# Patient Record
Sex: FEMALE | Race: WHITE | NOT HISPANIC OR LATINO | Employment: FULL TIME | URBAN - NONMETROPOLITAN AREA
[De-identification: names, ages, dates, MRNs, and addresses within clinical notes are randomized per-mention and may not be internally consistent; named-entity substitution may affect disease eponyms.]

---

## 2020-03-06 ENCOUNTER — TRANSFERRED RECORDS (OUTPATIENT)
Dept: HEALTH INFORMATION MANAGEMENT | Facility: OTHER | Age: 50
End: 2020-03-06

## 2021-07-27 ENCOUNTER — TRANSFERRED RECORDS (OUTPATIENT)
Dept: MULTI SPECIALTY CLINIC | Facility: CLINIC | Age: 51
End: 2021-07-27

## 2021-07-27 LAB — PAP-ABSTRACT: NORMAL

## 2023-03-01 ENCOUNTER — APPOINTMENT (OUTPATIENT)
Dept: LAB | Facility: OTHER | Age: 53
End: 2023-03-01

## 2023-03-01 ENCOUNTER — LAB REQUISITION (OUTPATIENT)
Dept: LAB | Facility: OTHER | Age: 53
End: 2023-03-01
Payer: COMMERCIAL

## 2023-03-01 DIAGNOSIS — Z02.1 ENCOUNTER FOR PRE-EMPLOYMENT EXAMINATION: ICD-10-CM

## 2023-03-01 PROCEDURE — 36415 COLL VENOUS BLD VENIPUNCTURE: CPT | Mod: ORL | Performed by: INTERNAL MEDICINE

## 2023-03-01 PROCEDURE — 86481 TB AG RESPONSE T-CELL SUSP: CPT | Performed by: INTERNAL MEDICINE

## 2023-03-02 LAB
QUANTIFERON MITOGEN: 10 IU/ML
QUANTIFERON NIL TUBE: 0.06 IU/ML
QUANTIFERON TB1 TUBE: 0.06 IU/ML
QUANTIFERON TB2 TUBE: 0.05

## 2023-03-03 LAB
GAMMA INTERFERON BACKGROUND BLD IA-ACNC: 0.06 IU/ML
M TB IFN-G BLD-IMP: NEGATIVE
M TB IFN-G CD4+ BCKGRND COR BLD-ACNC: 9.94 IU/ML
MITOGEN IGNF BCKGRD COR BLD-ACNC: -0.01 IU/ML
MITOGEN IGNF BCKGRD COR BLD-ACNC: 0 IU/ML

## 2023-04-04 DIAGNOSIS — M25.541 ARTHRALGIA OF BOTH HANDS: Primary | ICD-10-CM

## 2023-04-04 DIAGNOSIS — M25.542 ARTHRALGIA OF BOTH HANDS: Primary | ICD-10-CM

## 2023-04-07 ENCOUNTER — HOSPITAL ENCOUNTER (OUTPATIENT)
Dept: GENERAL RADIOLOGY | Facility: OTHER | Age: 53
Discharge: HOME | End: 2023-04-07
Attending: SPECIALIST
Payer: COMMERCIAL

## 2023-04-07 ENCOUNTER — OFFICE VISIT (OUTPATIENT)
Dept: ORTHOPEDICS | Facility: OTHER | Age: 53
End: 2023-04-07
Attending: SPECIALIST
Payer: COMMERCIAL

## 2023-04-07 VITALS — OXYGEN SATURATION: 98 % | HEART RATE: 65 BPM

## 2023-04-07 DIAGNOSIS — M25.542 ARTHRALGIA OF BOTH HANDS: ICD-10-CM

## 2023-04-07 DIAGNOSIS — M25.542 ARTHRALGIA OF BOTH HANDS: Primary | Chronic | ICD-10-CM

## 2023-04-07 DIAGNOSIS — M25.541 ARTHRALGIA OF BOTH HANDS: ICD-10-CM

## 2023-04-07 DIAGNOSIS — M25.541 ARTHRALGIA OF BOTH HANDS: Primary | Chronic | ICD-10-CM

## 2023-04-07 PROCEDURE — 250N000011 HC RX IP 250 OP 636: Performed by: SPECIALIST

## 2023-04-07 PROCEDURE — 20600 DRAIN/INJ JOINT/BURSA W/O US: CPT | Mod: FA | Performed by: SPECIALIST

## 2023-04-07 PROCEDURE — 73130 X-RAY EXAM OF HAND: CPT | Mod: LT

## 2023-04-07 PROCEDURE — 250N000009 HC RX 250: Performed by: SPECIALIST

## 2023-04-07 RX ORDER — BUPIVACAINE HYDROCHLORIDE 5 MG/ML
2 INJECTION, SOLUTION EPIDURAL; INTRACAUDAL ONCE
Status: COMPLETED | OUTPATIENT
Start: 2023-04-07 | End: 2023-04-07

## 2023-04-07 RX ORDER — METHYLPREDNISOLONE ACETATE 40 MG/ML
40 INJECTION, SUSPENSION INTRA-ARTICULAR; INTRALESIONAL; INTRAMUSCULAR; SOFT TISSUE ONCE
Status: COMPLETED | OUTPATIENT
Start: 2023-04-07 | End: 2023-04-07

## 2023-04-07 RX ADMIN — METHYLPREDNISOLONE ACETATE 40 MG: 40 INJECTION, SUSPENSION INTRA-ARTICULAR; INTRALESIONAL; INTRAMUSCULAR; SOFT TISSUE at 09:16

## 2023-04-07 RX ADMIN — BUPIVACAINE HYDROCHLORIDE 10 MG: 5 INJECTION, SOLUTION EPIDURAL; INTRACAUDAL; PERINEURAL at 09:15

## 2023-04-07 ASSESSMENT — PAIN SCALES - GENERAL: PAINLEVEL: MODERATE PAIN (4)

## 2023-04-07 NOTE — PROGRESS NOTES
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  I am seeing her today for evaluation of bilateral hand complaints.  These involve specifically the right ring finger PIP joint, the left small finger PIP joint and left thumb basilar joint.  She had a history of right ring finger injection years ago, which helped.  She has an equivocal family history for rheumatoid arthritis.    PHYSICAL EXAMINATION:  Examination reveals a 52-year-old female.  She is alert and oriented x3 and appropriate.  Gait and station are appropriate.  She is well groomed, well kempt.  Examination of both upper extremities reveals full and symmetric range of motion of elbows.  Examination of both wrists show no evidence of extensor or flexor tenosynovitis.  Examination of the left thumb basilar joint shows tenderness with a positive grind maneuver.  Palmar abduction is mildly limited.  Ring finger PIP joint on the right shows 20-55 degree arc of motion.  The left small finger shows a 15-70 degree arc of motion.    IMAGING:  X-rays were reviewed including AP, lateral, and oblique views of the right hand obtained in the office today.  These reveal severe degenerative changes of the right ring finger PIP joint.  Left hand films are also reviewed from today.  These show degenerative changes of the PIP joint of the left small finger with basilar joint arthrosis.  There is no evidence of obvious periarticular erosions.    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  We discussed proceeding with injections at all these sites.      PROCEDURE:  A sterile prep was performed and those three sites were each injected with Depo-Medrol 0.25% Marcaine with epinephrine, total volume of 20 mg of Depo in the digits and 40 in the base of the thumb.  After a period of observation, she  noted significant improvement.    PLAN:  Our plan will be to follow her clinically and proceed as indicated.  Ultimately arthrodesis of the PIP joint would be recommended and basilar joint reconstruction if her symptoms warrant it.    Julien Brewster MD        D: 2023   T: 2023   MT: JONATHAN    Name:     MADELAINE SEXTON  MRN:      [id number]        Account:    [id number]   :      1970           Visit Date: 2023     Document: [id number]

## 2023-04-07 NOTE — PROGRESS NOTES
;Patient is here for consult on her hands.  Deb Bailey LPN .....................4/7/2023 8:46 AM

## 2023-05-21 ENCOUNTER — HEALTH MAINTENANCE LETTER (OUTPATIENT)
Age: 53
End: 2023-05-21

## 2023-07-05 NOTE — PROGRESS NOTES
SUBJECTIVE:   CC: Rosmery is an 52 year old who presents for preventive health visit.     History of Present Illness       Reason for visit:  Wellness Annual check up    She eats 2-3 servings of fruits and vegetables daily.She consumes 0 sweetened beverage(s) daily.She exercises with enough effort to increase her heart rate 10 to 19 minutes per day.  She exercises with enough effort to increase her heart rate 3 or less days per week.   She is taking medications regularly.    Here for annual physical.  Recently moved to [place] and is requesting to establish care.    Had been on hormonal replacement therapy for vasomotor symptoms of menopause but patient was struggling with side effects so discontinued.  Not currently on any prescription medications.    Solitary pulmonary nodule noted as incidental finding 4/2021. 12 month follow up with repeat CT was recommended in 7/2021 but has not yet been completed.       Contraception: Post menopausal  Risk for STI?: No  Last pap: 07/27/2021, NIL, HPV- results will be abstracted  Any hx of abnormal paps:  Hx PITER II, Had negative pap and HPV 5/2020 as well per chart review  FH of early CA?: Mother with colon cancer  Cholesterol/DM concerns/screening: Due  Tobacco?: Former  Calcium intake: Dietary  DEXA: NA  Last mammo: Unable to review  Colonoscopy: Cologuard ordered in 2021- unsure if completed, new [facility] of colon cancer since then  Immunizations: UTD      Have you ever done Advance Care Planning? (For example, a Health Directive, POLST, or a discussion with a medical provider or your loved ones about your wishes): No, advance care planning information given to patient to review.  Patient declined advance care planning discussion at this time.    Social History     Tobacco Use     Smoking status: Never     Smokeless tobacco: Never   Substance Use Topics     Alcohol use: Yes     Reviewed orders with patient.  Reviewed health maintenance and updated orders accordingly -  "Yes      Breast Cancer Screening:    FHS-7:        No data to display                Mammogram Screening: Recommended annual mammography  Pertinent mammograms are reviewed under the imaging tab.    History of abnormal Pap smear: Last 3 Pap and HPV Results:         Reviewed and updated as needed this visit by clinical staff   Tobacco  Allergies  Meds     Fam Hx  Soc Hx        Reviewed and updated as needed this visit by Provider         [facility] Hx         No past medical history on file.   No past surgical history on file.  OB History   No obstetric history on file.       Review of Systems  CONSTITUTIONAL: NEGATIVE for fever, chills, change in weight  INTEGUMENTARY/SKIN: skin lesions  EYES: NEGATIVE for vision changes or irritation  ENT: NEGATIVE for ear, mouth and throat problems  RESP: NEGATIVE for significant cough or SOB  BREAST: NEGATIVE for masses, tenderness or discharge  CV: NEGATIVE for chest pain, palpitations or peripheral edema  GI: NEGATIVE for nausea, abdominal pain, heartburn, or change in bowel habits  : NEGATIVE for unusual urinary or vaginal symptoms. No vaginal bleeding.  MUSCULOSKELETAL:bilateral hand pain  NEURO: NEGATIVE for weakness, dizziness or paresthesias  PSYCHIATRIC: NEGATIVE for changes in mood or affect      OBJECTIVE:   /78   Pulse 64   Temp (!) 96.5  F (35.8  C)   Resp 14   Ht 1.676 m (5' 6\")   Wt 84.2 kg (185 lb 9.6 oz)   LMP 01/01/2018 (Exact Date)   SpO2 96%   BMI 29.96 kg/m    Physical Exam  GENERAL: healthy, alert and no distress  EYES: Eyes grossly normal to inspection, PERRL and conjunctivae and sclerae normal  HENT: ear canals and TM's normal, nose and mouth without ulcers or lesions  NECK: no adenopathy, no asymmetry, masses, or scars and thyroid normal to palpation  RESP: lungs clear to auscultation - no rales, rhonchi or wheezes  BREAST: normal without masses, tenderness or nipple discharge and no palpable axillary masses or adenopathy  CV: regular rate and " rhythm, normal S1 S2, no S3 or S4, no murmur, click or rub, no peripheral edema and peripheral pulses strong  MS: no gross musculoskeletal defects noted, no edema  SKIN: no suspicious lesions or rashes  SKIN: no suspicious lesions or rashes and numerous brown raised sand paper like lesions throughout back and right breast  NEURO: Normal strength and tone, mentation intact and speech normal  PSYCH: mentation appears normal, affect normal/bright    Diagnostic Test Results:  Labs reviewed in Epic    ASSESSMENT/PLAN:       ICD-10-CM    1. Routine history and physical examination of adult  Z00.00 Lipid Panel     CBC and Differential     Basic Metabolic Panel      2. Encounter for screening mammogram for breast cancer  Z12.31 MA Screen Bilateral w/Jef      3. Special screening for malignant neoplasms, colon  Z12.11 Colonoscopy Screening  Referral      4. Family history of colon cancer  Z80.0 Colonoscopy Screening  Referral      5. Solitary pulmonary nodule on lung CT  R91.1         1. UTD on pap smear, due in 2026. Labs pending as above. UTD on immunizations. Encourage healthy lifestyle.     2. Mammogram ordered.    3, 4. Mother recently diagnosed with colon cancer. Unable to review previous Cologuard results. Referral for colon cancer screening.     5. Incidentally noted in 05/2021. Recommended repeat CT chest which patient deferred at this time. She will consider once the new calendar year starts for better insurance coverage.     Patient has been advised of split billing requirements and indicates understanding: Yes      COUNSELING:  Reviewed preventive health counseling, as reflected in patient instructions        She reports that she has never smoked. She has never used smokeless tobacco.      Lesli Rivera PA-C  [facility] AND \[facility]\""

## 2023-07-06 ENCOUNTER — OFFICE VISIT (OUTPATIENT)
Dept: FAMILY MEDICINE | Facility: OTHER | Age: 53
End: 2023-07-06
Attending: PHYSICIAN ASSISTANT
Payer: COMMERCIAL

## 2023-07-06 VITALS
DIASTOLIC BLOOD PRESSURE: 78 MMHG | WEIGHT: 185.6 LBS | HEIGHT: 66 IN | RESPIRATION RATE: 14 BRPM | TEMPERATURE: 96.5 F | BODY MASS INDEX: 29.83 KG/M2 | SYSTOLIC BLOOD PRESSURE: 124 MMHG | OXYGEN SATURATION: 96 % | HEART RATE: 64 BPM

## 2023-07-06 DIAGNOSIS — Z12.31 ENCOUNTER FOR SCREENING MAMMOGRAM FOR BREAST CANCER: ICD-10-CM

## 2023-07-06 DIAGNOSIS — Z80.0 FAMILY HISTORY OF COLON CANCER: ICD-10-CM

## 2023-07-06 DIAGNOSIS — Z12.11 SPECIAL SCREENING FOR MALIGNANT NEOPLASMS, COLON: Primary | ICD-10-CM

## 2023-07-06 DIAGNOSIS — R91.1 SOLITARY PULMONARY NODULE ON LUNG CT: ICD-10-CM

## 2023-07-06 DIAGNOSIS — Z12.11 SPECIAL SCREENING FOR MALIGNANT NEOPLASMS, COLON: ICD-10-CM

## 2023-07-06 DIAGNOSIS — Z00.00 ROUTINE HISTORY AND PHYSICAL EXAMINATION OF ADULT: Primary | ICD-10-CM

## 2023-07-06 PROBLEM — R31.0 GROSS HEMATURIA: Status: ACTIVE | Noted: 2022-03-16

## 2023-07-06 PROBLEM — N95.1 VASOMOTOR SYMPTOMS DUE TO MENOPAUSE: Status: ACTIVE | Noted: 2021-03-15

## 2023-07-06 PROBLEM — M19.041 PRIMARY OSTEOARTHRITIS, RIGHT HAND: Status: ACTIVE | Noted: 2019-12-06

## 2023-07-06 PROBLEM — H02.9 UNSPECIFIED DISORDER OF EYELID: Status: ACTIVE | Noted: 2019-12-06

## 2023-07-06 PROBLEM — K80.20 CALCULUS OF GALLBLADDER WITHOUT CHOLECYSTITIS WITHOUT OBSTRUCTION: Status: ACTIVE | Noted: 2021-08-17

## 2023-07-06 PROBLEM — E66.811 OBESITY (BMI 30.0-34.9): Status: ACTIVE | Noted: 2021-03-15

## 2023-07-06 LAB
ANION GAP SERPL CALCULATED.3IONS-SCNC: 9 MMOL/L (ref 7–15)
BASOPHILS # BLD AUTO: 0.1 10E3/UL (ref 0–0.2)
BASOPHILS NFR BLD AUTO: 1 %
BUN SERPL-MCNC: 14.4 MG/DL (ref 6–20)
CALCIUM SERPL-MCNC: 9.6 MG/DL (ref 8.6–10)
CHLORIDE SERPL-SCNC: 105 MMOL/L (ref 98–107)
CHOLEST SERPL-MCNC: 193 MG/DL
CREAT SERPL-MCNC: 0.7 MG/DL (ref 0.51–0.95)
DEPRECATED HCO3 PLAS-SCNC: 27 MMOL/L (ref 22–29)
EOSINOPHIL # BLD AUTO: 0.1 10E3/UL (ref 0–0.7)
EOSINOPHIL NFR BLD AUTO: 2 %
ERYTHROCYTE [DISTWIDTH] IN BLOOD BY AUTOMATED COUNT: 12.7 % (ref 10–15)
GFR SERPL CREATININE-BSD FRML MDRD: >90 ML/MIN/1.73M2
GLUCOSE SERPL-MCNC: 98 MG/DL (ref 70–99)
HCT VFR BLD AUTO: 43 % (ref 35–47)
HDLC SERPL-MCNC: 56 MG/DL
HGB BLD-MCNC: 14.1 G/DL (ref 11.7–15.7)
IMM GRANULOCYTES # BLD: 0 10E3/UL
IMM GRANULOCYTES NFR BLD: 0 %
LDLC SERPL CALC-MCNC: 116 MG/DL
LYMPHOCYTES # BLD AUTO: 1.3 10E3/UL (ref 0.8–5.3)
LYMPHOCYTES NFR BLD AUTO: 30 %
MCH RBC QN AUTO: 31 PG (ref 26.5–33)
MCHC RBC AUTO-ENTMCNC: 32.8 G/DL (ref 31.5–36.5)
MCV RBC AUTO: 95 FL (ref 78–100)
MONOCYTES # BLD AUTO: 0.4 10E3/UL (ref 0–1.3)
MONOCYTES NFR BLD AUTO: 10 %
NEUTROPHILS # BLD AUTO: 2.4 10E3/UL (ref 1.6–8.3)
NEUTROPHILS NFR BLD AUTO: 57 %
NONHDLC SERPL-MCNC: 137 MG/DL
NRBC # BLD AUTO: 0 10E3/UL
NRBC BLD AUTO-RTO: 0 /100
PLATELET # BLD AUTO: 338 10E3/UL (ref 150–450)
POTASSIUM SERPL-SCNC: 4.2 MMOL/L (ref 3.4–5.3)
RBC # BLD AUTO: 4.55 10E6/UL (ref 3.8–5.2)
SODIUM SERPL-SCNC: 141 MMOL/L (ref 136–145)
TRIGL SERPL-MCNC: 106 MG/DL
WBC # BLD AUTO: 4.2 10E3/UL (ref 4–11)

## 2023-07-06 PROCEDURE — 36415 COLL VENOUS BLD VENIPUNCTURE: CPT | Mod: ZL | Performed by: PHYSICIAN ASSISTANT

## 2023-07-06 PROCEDURE — 80061 LIPID PANEL: CPT | Mod: ZL | Performed by: PHYSICIAN ASSISTANT

## 2023-07-06 PROCEDURE — 85025 COMPLETE CBC W/AUTO DIFF WBC: CPT | Mod: ZL | Performed by: PHYSICIAN ASSISTANT

## 2023-07-06 PROCEDURE — 80048 BASIC METABOLIC PNL TOTAL CA: CPT | Mod: ZL | Performed by: PHYSICIAN ASSISTANT

## 2023-07-06 PROCEDURE — 99396 PREV VISIT EST AGE 40-64: CPT | Performed by: PHYSICIAN ASSISTANT

## 2023-07-06 RX ORDER — BISACODYL 5 MG/1
TABLET, DELAYED RELEASE ORAL
Qty: 2 TABLET | Refills: 0 | Status: ON HOLD | OUTPATIENT
Start: 2023-07-06 | End: 2023-11-08

## 2023-07-06 RX ORDER — POLYETHYLENE GLYCOL 3350, SODIUM CHLORIDE, SODIUM BICARBONATE, POTASSIUM CHLORIDE 420; 11.2; 5.72; 1.48 G/4L; G/4L; G/4L; G/4L
4000 POWDER, FOR SOLUTION ORAL ONCE
Qty: 4000 ML | Refills: 0 | Status: SHIPPED | OUTPATIENT
Start: 2023-07-06 | End: 2023-07-06

## 2023-07-06 ASSESSMENT — PAIN SCALES - GENERAL: PAINLEVEL: EXTREME PAIN (8)

## 2023-07-06 NOTE — NURSING NOTE
Patient presents to clinic for annual physical.  Mojgan Lawrence LPN ....................  7/6/2023   7:39 AM  EXT [id number]

## 2023-07-06 NOTE — TELEPHONE ENCOUNTER
Screening Questions for the Scheduling of Screening Colonoscopies   (If Colonoscopy is diagnostic, Provider should review the chart before scheduling.)  Are you younger than 50 or older than 80?  NO  Do you take aspirin or fish oil?  NO (if yes, tell patient to stop 1 week prior to Colonoscopy)  Do you take warfarin (Coumadin), clopidogrel (Plavix), apixaban (Eliquis), dabigatram (Pradaxa), rivaroxaban (Xarelto) or any blood thinner? NO  Do you use oxygen at home?  NO  Do you have kidney disease? NO  Are you on dialysis? NO  Have you had a stroke or heart attack in the last year? NO  Have you had a stent in your heart or any blood vessel in the last year? NO  Have you had a transplant of any organ? NO  Have you had a colonoscopy or upper endoscopy (EGD) before? NO  Date of scheduled Colonoscopy. 11/08/2023  Provider [facility]  Pharmacy [facility]

## 2023-10-16 ENCOUNTER — HOSPITAL ENCOUNTER (OUTPATIENT)
Dept: MAMMOGRAPHY | Facility: OTHER | Age: 53
Discharge: HOME | End: 2023-10-16
Attending: PHYSICIAN ASSISTANT | Admitting: PHYSICIAN ASSISTANT
Payer: COMMERCIAL

## 2023-10-16 DIAGNOSIS — Z12.31 ENCOUNTER FOR SCREENING MAMMOGRAM FOR BREAST CANCER: ICD-10-CM

## 2023-10-16 PROCEDURE — 77063 BREAST TOMOSYNTHESIS BI: CPT

## 2023-10-18 ENCOUNTER — HOSPITAL ENCOUNTER (OUTPATIENT)
Dept: ULTRASOUND IMAGING | Facility: OTHER | Age: 53
Discharge: HOME | End: 2023-10-18
Attending: PHYSICIAN ASSISTANT
Payer: COMMERCIAL

## 2023-10-18 ENCOUNTER — HOSPITAL ENCOUNTER (OUTPATIENT)
Dept: MAMMOGRAPHY | Facility: OTHER | Age: 53
Discharge: HOME | End: 2023-10-18
Attending: PHYSICIAN ASSISTANT
Payer: COMMERCIAL

## 2023-10-18 DIAGNOSIS — R92.8 ABNORMAL FINDING ON BREAST IMAGING: ICD-10-CM

## 2023-10-18 PROCEDURE — 76642 ULTRASOUND BREAST LIMITED: CPT | Mod: LT

## 2023-10-18 PROCEDURE — G0279 TOMOSYNTHESIS, MAMMO: HCPCS | Mod: LT

## 2023-11-08 ENCOUNTER — HOSPITAL ENCOUNTER (OUTPATIENT)
Facility: OTHER | Age: 53
Discharge: HOME | End: 2023-11-08
Attending: SURGERY | Admitting: SURGERY
Payer: COMMERCIAL

## 2023-11-08 ENCOUNTER — ANESTHESIA EVENT (OUTPATIENT)
Dept: SURGERY | Facility: OTHER | Age: 53
End: 2023-11-08
Payer: COMMERCIAL

## 2023-11-08 ENCOUNTER — ANESTHESIA (OUTPATIENT)
Dept: SURGERY | Facility: OTHER | Age: 53
End: 2023-11-08
Payer: COMMERCIAL

## 2023-11-08 VITALS
BODY MASS INDEX: 29.76 KG/M2 | HEART RATE: 62 BPM | OXYGEN SATURATION: 98 % | TEMPERATURE: 96.9 F | SYSTOLIC BLOOD PRESSURE: 116 MMHG | DIASTOLIC BLOOD PRESSURE: 91 MMHG | WEIGHT: 184.4 LBS | RESPIRATION RATE: 18 BRPM

## 2023-11-08 DIAGNOSIS — K57.30 DIVERTICULOSIS OF COLON WITHOUT DIVERTICULITIS: Primary | ICD-10-CM

## 2023-11-08 PROCEDURE — G0121 COLON CA SCRN NOT HI RSK IND: HCPCS | Performed by: SURGERY

## 2023-11-08 PROCEDURE — 250N000009 HC RX 250: Performed by: NURSE ANESTHETIST, CERTIFIED REGISTERED

## 2023-11-08 PROCEDURE — 45378 DIAGNOSTIC COLONOSCOPY: CPT | Performed by: SURGERY

## 2023-11-08 PROCEDURE — 45378 DIAGNOSTIC COLONOSCOPY: CPT | Performed by: NURSE ANESTHETIST, CERTIFIED REGISTERED

## 2023-11-08 PROCEDURE — 250N000011 HC RX IP 250 OP 636: Performed by: NURSE ANESTHETIST, CERTIFIED REGISTERED

## 2023-11-08 PROCEDURE — 258N000003 HC RX IP 258 OP 636: Performed by: SURGERY

## 2023-11-08 RX ORDER — PROCHLORPERAZINE MALEATE 5 MG
10 TABLET ORAL EVERY 6 HOURS PRN
Status: DISCONTINUED | OUTPATIENT
Start: 2023-11-08 | End: 2023-11-08 | Stop reason: HOSPADM

## 2023-11-08 RX ORDER — LIDOCAINE 40 MG/G
CREAM TOPICAL
Status: DISCONTINUED | OUTPATIENT
Start: 2023-11-08 | End: 2023-11-08 | Stop reason: HOSPADM

## 2023-11-08 RX ORDER — NALOXONE HYDROCHLORIDE 0.4 MG/ML
0.2 INJECTION, SOLUTION INTRAMUSCULAR; INTRAVENOUS; SUBCUTANEOUS
Status: DISCONTINUED | OUTPATIENT
Start: 2023-11-08 | End: 2023-11-08 | Stop reason: HOSPADM

## 2023-11-08 RX ORDER — PROPOFOL 10 MG/ML
INJECTION, EMULSION INTRAVENOUS PRN
Status: DISCONTINUED | OUTPATIENT
Start: 2023-11-08 | End: 2023-11-08

## 2023-11-08 RX ORDER — NALOXONE HYDROCHLORIDE 0.4 MG/ML
0.4 INJECTION, SOLUTION INTRAMUSCULAR; INTRAVENOUS; SUBCUTANEOUS
Status: DISCONTINUED | OUTPATIENT
Start: 2023-11-08 | End: 2023-11-08 | Stop reason: HOSPADM

## 2023-11-08 RX ORDER — ONDANSETRON 4 MG/1
4 TABLET, ORALLY DISINTEGRATING ORAL EVERY 6 HOURS PRN
Status: DISCONTINUED | OUTPATIENT
Start: 2023-11-08 | End: 2023-11-08 | Stop reason: HOSPADM

## 2023-11-08 RX ORDER — ONDANSETRON 2 MG/ML
4 INJECTION INTRAMUSCULAR; INTRAVENOUS EVERY 6 HOURS PRN
Status: DISCONTINUED | OUTPATIENT
Start: 2023-11-08 | End: 2023-11-08 | Stop reason: HOSPADM

## 2023-11-08 RX ORDER — FLUMAZENIL 0.1 MG/ML
0.2 INJECTION, SOLUTION INTRAVENOUS
Status: DISCONTINUED | OUTPATIENT
Start: 2023-11-08 | End: 2023-11-08 | Stop reason: HOSPADM

## 2023-11-08 RX ORDER — PROPOFOL 10 MG/ML
INJECTION, EMULSION INTRAVENOUS CONTINUOUS PRN
Status: DISCONTINUED | OUTPATIENT
Start: 2023-11-08 | End: 2023-11-08

## 2023-11-08 RX ORDER — ONDANSETRON 2 MG/ML
4 INJECTION INTRAMUSCULAR; INTRAVENOUS
Status: DISCONTINUED | OUTPATIENT
Start: 2023-11-08 | End: 2023-11-08 | Stop reason: HOSPADM

## 2023-11-08 RX ORDER — ONDANSETRON 2 MG/ML
INJECTION INTRAMUSCULAR; INTRAVENOUS PRN
Status: DISCONTINUED | OUTPATIENT
Start: 2023-11-08 | End: 2023-11-08

## 2023-11-08 RX ORDER — SODIUM CHLORIDE, SODIUM LACTATE, POTASSIUM CHLORIDE, CALCIUM CHLORIDE 600; 310; 30; 20 MG/100ML; MG/100ML; MG/100ML; MG/100ML
INJECTION, SOLUTION INTRAVENOUS CONTINUOUS
Status: DISCONTINUED | OUTPATIENT
Start: 2023-11-08 | End: 2023-11-08 | Stop reason: HOSPADM

## 2023-11-08 RX ORDER — LIDOCAINE HYDROCHLORIDE 20 MG/ML
INJECTION, SOLUTION INFILTRATION; PERINEURAL PRN
Status: DISCONTINUED | OUTPATIENT
Start: 2023-11-08 | End: 2023-11-08

## 2023-11-08 RX ADMIN — PROPOFOL 100 MG: 10 INJECTION, EMULSION INTRAVENOUS at 08:53

## 2023-11-08 RX ADMIN — PROPOFOL 150 MCG/KG/MIN: 10 INJECTION, EMULSION INTRAVENOUS at 08:56

## 2023-11-08 RX ADMIN — ONDANSETRON HYDROCHLORIDE 4 MG: 2 SOLUTION INTRAMUSCULAR; INTRAVENOUS at 08:57

## 2023-11-08 RX ADMIN — SODIUM CHLORIDE, SODIUM LACTATE, POTASSIUM CHLORIDE, AND CALCIUM CHLORIDE: 600; 310; 30; 20 INJECTION, SOLUTION INTRAVENOUS at 08:48

## 2023-11-08 RX ADMIN — LIDOCAINE HYDROCHLORIDE 60 MG: 20 INJECTION, SOLUTION INFILTRATION; PERINEURAL at 08:53

## 2023-11-08 ASSESSMENT — ACTIVITIES OF DAILY LIVING (ADL)
ADLS_ACUITY_SCORE: 35
ADLS_ACUITY_SCORE: 35
ADLS_ACUITY_SCORE: 33

## 2023-11-08 NOTE — ANESTHESIA CARE TRANSFER NOTE
Patient: Chantel Menard    Procedure: Procedure(s):  Colonoscopy       Diagnosis: Encounter for screening colonoscopy [Z12.11]  Family hx of colon cancer [Z80.0]  Diagnosis Additional Information: No value filed.    Anesthesia Type:   MAC     Note:    Oropharynx: oropharynx clear of all foreign objects and spontaneously breathing  Level of Consciousness: drowsy  Oxygen Supplementation: room air    Independent Airway: airway patency satisfactory and stable  Dentition: dentition unchanged  Vital Signs Stable: post-procedure vital signs reviewed and stable    Patient transferred to: Phase II    Handoff Report: Identifed the Patient, Identified the Reponsible Provider, Reviewed the pertinent medical history, Discussed the surgical course, Reviewed Intra-OP anesthesia mangement and issues during anesthesia, Set expectations for post-procedure period and Allowed opportunity for questions and acknowledgement of understanding      Vitals:  Vitals Value Taken Time   BP     Temp     Pulse     Resp     SpO2         Electronically Signed By: ELIAZAR PHAN [facility]  November 8, 2023  9:34 AM

## 2023-11-08 NOTE — ANESTHESIA POSTPROCEDURE EVALUATION
Patient: Chantel Menard    Procedure: Procedure(s):  Colonoscopy       Anesthesia Type:  MAC    Note:  Disposition: Outpatient   Postop Pain Control: Uneventful            Sign Out: Well controlled pain   PONV: No   Neuro/Psych: Uneventful            Sign Out: Acceptable/Baseline neuro status   Airway/Respiratory: Uneventful            Sign Out: Acceptable/Baseline resp. status   CV/Hemodynamics: Uneventful            Sign Out: Acceptable CV status; No obvious hypovolemia; No obvious fluid overload   Other NRE: NONE   DID A NON-ROUTINE EVENT OCCUR?            Last vitals:  Vitals Value Taken Time   /91 11/08/23 1030   Temp 96.9  F (36.1  C) 11/08/23 0933   Pulse 62 11/08/23 1030   Resp 16 11/08/23 0945   SpO2 96 % 11/08/23 1037   Vitals shown include unfiled device data.    Electronically Signed By: ELIAZAR MOSES CRNA  November 8, 2023  10:37 AM

## 2023-11-08 NOTE — ANESTHESIA PREPROCEDURE EVALUATION
Anesthesia Pre-Procedure Evaluation    Patient: Chantel Menard   MRN: [id number] : 1970        Procedure : Procedure(s):  Colonoscopy          History reviewed. No pertinent past medical history.   History reviewed. No pertinent surgical history.   No Known Allergies   Social History     Tobacco Use    Smoking status: Never    Smokeless tobacco: Never   Substance Use Topics    Alcohol use: Yes      Wt Readings from Last 1 Encounters:   23 84.2 kg (185 lb 9.6 oz)        Anesthesia Evaluation   Pt has had prior anesthetic. Type: General.    History of anesthetic complications  - motion sickness and PONV.      ROS/MED HX  ENT/Pulmonary:  - neg pulmonary ROS     Neurologic:  - neg neurologic ROS     Cardiovascular: Comment: Pt states he heart has some extra beats and makes her cough-she is investigating this in the near future. Denies any other cardiac symptoms.  Auscultation reveals regular heart beats with what sounds to be occasional added beats    (+)  - -   -  - -                          Irregular Heartbeat/Palpitations,            METS/Exercise Tolerance: >4 METS    Hematologic:  - neg hematologic  ROS     Musculoskeletal:  - neg musculoskeletal ROS     GI/Hepatic:     (+)        bowel prep,            Renal/Genitourinary:  - neg Renal ROS     Endo:  - neg endo ROS     Psychiatric/Substance Use:  - neg psychiatric ROS     Infectious Disease:  - neg infectious disease ROS     Malignancy:  - neg malignancy ROS     Other:  - neg other ROS          Physical Exam    Airway        Mallampati: III   TM distance: < 3 FB   Neck ROM: full   Mouth opening: > 3 cm    Respiratory Devices and Support         Dental       (+) Completely normal teeth      Cardiovascular   cardiovascular exam normal          Pulmonary   pulmonary exam normal                OUTSIDE LABS:  CBC:   Lab Results   Component Value Date    WBC 4.2 2023    HGB 14.1 2023    HCT 43.0 2023     2023     BMP:  "  Lab Results   Component Value Date     07/06/2023    POTASSIUM 4.2 07/06/2023    CHLORIDE 105 07/06/2023    CO2 27 07/06/2023    BUN 14.4 07/06/2023    CR 0.70 07/06/2023    GLC 98 07/06/2023     COAGS: No results found for: \"PTT\", \"INR\", \"FIBR\"  POC: No results found for: \"BGM\", \"HCG\", \"HCGS\"  HEPATIC: No results found for: \"ALBUMIN\", \"PROTTOTAL\", \"ALT\", \"AST\", \"GGT\", \"ALKPHOS\", \"BILITOTAL\", \"BILIDIRECT\", \"CHRISTIAN\"  OTHER:   Lab Results   Component Value Date    ISABEL 9.6 07/06/2023       Anesthesia Plan    ASA Status:  1    NPO Status:  NPO Appropriate    Anesthesia Type: MAC.              Consents    Anesthesia Plan(s) and associated risks, benefits, and realistic alternatives discussed. Questions answered and patient/representative(s) expressed understanding.     - Discussed: Risks, Benefits and Alternatives for BOTH SEDATION and the PROCEDURE were discussed     - Discussed with:  Patient            Postoperative Care       PONV prophylaxis: Ondansetron (or other 5HT-3)     Comments:                ELIAZAR MOSES CRNA  "

## 2023-11-08 NOTE — OR NURSING
Rosmery has been discharged to home at 1106 via ambulatory accompanied by LASHANDA West    Written discharge instructions were provided to patient.  Prescriptions were N/A.  Patient states their pain is denies, and denies any nausea or dizziness upon discharge.    Patient and adult caring for them verbalize understanding of discharge instructions including no driving until tomorrow and no longer taking narcotic pain medications - no operating mechanical equipment and no making any important decisions.They understand reason for discharge, and necessary follow-up appointments.      Jenna Paula RN on 11/8/2023 at 11:27 AM

## 2023-11-08 NOTE — OP NOTE
PROCEDURE NOTE    SURGEON:Brandy Beavers MD.    PRE-OP DIAGNOSIS:  Screening Colonoscopy      POST-OP DIAGNOSIS: normal colon, sigmoid diverticula    PROCEDURE:  Screening colonoscopy    ESTIMATED BLOODLOSS: none    COMPLICATIONS:  None    SPECIMEN:  none    ANESTHESIA:  See anesthesia note    INDICATION FOR THE PROCEDURE: The patient is a 53 year old female. The patient has no complaints. I explained to the patient the risks, benefits and alternatives to screening colonoscopy for evaluating the colon for colon polyps and colon cancer. We specifically discussed the risks of bleeding, infection, perforation, potential inability to reach the cecum and the risks of sedation. The patient's questions were answered and the patient wished to proceed. Informed consent paperwork was completed.    PROCEDURE: The patient was taken to the endoscopy suite. Appropriate monitors were attached. The patient was placed in the left lateral decubitus position.Timeout was performed confirming the patient's identity and procedure to be performed. After appropriate sedation was confirmed, digital rectal exam was performed. There was normal tone and no gross abnormality was noted. The lubricated colonoscope was introduced into the anus the colon was insufflated with air. The prep quality was adequate. Under direct visualization the scope was advanced to the cecum. The ileocecal valve was intubated and the terminal ileum inspected. No gross abnormality was noted. The scope was withdrawn back into the cecum. The mucosa of colon was inspected while withdrawing the scope. No abnormalities were noted other than scattered diverticula of the sigmoid colon. The scope was retroflexed in the rectum and the anorectal junction was inspected. No abnormalities were noted. The scope was returned to a neutral position and the colon was decompressed. The scope was removed. The patient tolerated the procedure with no immediately apparent complication. The  patient was taken to recovery in stable condition.    FOLLOW UP:RECOMMEND high fiber diet, follow up: 10 year screening colonoscopy.

## 2023-11-08 NOTE — DISCHARGE INSTRUCTIONS
Procedure you had done: colonoscopy-no polyps  Your health care provider is:  No Ref-Primary, Physician  Your surgeon is Dr. Brandy Beavers.   Please call your health care provider or surgeon at [phone] if:    - you feel you are getting worse or having an increase in problems    - fever greater than 101 degrees  - increasing shortness of breath or chest pain  - any signs of infection (increasing redness, swelling, tenderness, warmth, change in appearance, or  increased drainage)  - blood in your urine or stool  - coughing or vomiting blood  - nausea (upset stomach) and vomiting and/or diarrhea that will not stop  - severe pain that is not relieved by medicine, rest or ice  You have had medications for sedation. Please be aware that this can cause drowsiness and impaired judgment for up to 24 hours after your procedure. Do not drive, operate power tools or drink alcohol for 24 hours.  If samples were taken-you will get a phone call and a letter with your results in the next 7-10 days. If you don't get results, please call and let us know!   [facility]  Adult Discharge Orders & Instructions      For 24 hours after surgery:  Get plenty of rest.  A responsible adult must stay with you for at least 24 hours after you leave the hospital.   You may feel lightheaded.  IF so, sit for a few minutes before standing.  Have someone help you get up.   You may have a slight fever. Call the doctor if your fever is over 101 F (38.3 C) (taken under the tongue) or lasts longer than 24 hours.  You may have a dry mouth, a sore throat, muscle aches or trouble sleeping.  These should go away after 24 hours.  Do not make important or legal decisions.  6.   Do not drive or use heavy equipment.  If you have weakness or tingling, don't drive or use heavy equipment until this feeling goes away.  To contact a doctor, call    [phone]______________

## 2023-11-08 NOTE — H&P
PRE-PROCEDURE NOTE    CHIEF COMPLAINT / REASON FORPROCEDURE:  Need for screening colonoscopy.    PERTINENT HISTORY   Patient with no complaints. Previous colonoscopy none. No diarrhea, constipation, abdominal pain or rectal bleeding. Mom recently diagnosed with colon cancer.  History reviewed. No pertinent past medical history.  History reviewed. No pertinent surgical history.  Other:  None  Bleeding tendencies:  No    Relevant Family History:  yes, as above    Relevant Social History:  none    A relevant review of systems was performed and was Negative.    ALLERGIES/SENSITIVITIES: No Known Allergies     CURRENTMEDICATIONS:    No current facility-administered medications on file prior to encounter.  No current outpatient medications on file prior to encounter.    Current Facility-Administered Medications   Medication    lactated ringers infusion    lidocaine (LMX4) cream    lidocaine 1 % 0.1-1 mL    ondansetron (ZOFRAN) injection 4 mg    sodium chloride (PF) 0.9% PF flush 3 mL    sodium chloride (PF) 0.9% PF flush 3 mL       PRE-SEDATION ASSESSMENT:    /87   Pulse 68   Temp 97.9  F (36.6  C) (Tympanic)   Resp 16   Wt 83.6 kg (184 lb 6.4 oz)   LMP 01/01/2018 (Exact Date)   SpO2 97%   BMI 29.76 kg/m    Lung Exam:  Normal  Heart Exam:  Normal    Comment(s):      IMPRESSION:  Need for screening colonoscopy.    PLAN:  I discussed screening colonoscopy with the patient.

## 2024-08-07 ENCOUNTER — OFFICE VISIT (OUTPATIENT)
Dept: FAMILY MEDICINE | Facility: OTHER | Age: 54
End: 2024-08-07
Payer: COMMERCIAL

## 2024-08-07 ENCOUNTER — HOSPITAL ENCOUNTER (OUTPATIENT)
Dept: GENERAL RADIOLOGY | Facility: OTHER | Age: 54
Discharge: HOME | End: 2024-08-07
Attending: NURSE PRACTITIONER
Payer: COMMERCIAL

## 2024-08-07 VITALS
SYSTOLIC BLOOD PRESSURE: 116 MMHG | RESPIRATION RATE: 12 BRPM | DIASTOLIC BLOOD PRESSURE: 90 MMHG | HEART RATE: 68 BPM | OXYGEN SATURATION: 98 % | HEIGHT: 66 IN | TEMPERATURE: 97 F | BODY MASS INDEX: 33.75 KG/M2 | WEIGHT: 210 LBS

## 2024-08-07 DIAGNOSIS — M54.50 ACUTE BILATERAL LOW BACK PAIN WITHOUT SCIATICA: Primary | ICD-10-CM

## 2024-08-07 PROCEDURE — 72100 X-RAY EXAM L-S SPINE 2/3 VWS: CPT

## 2024-08-07 PROCEDURE — 99213 OFFICE O/P EST LOW 20 MIN: CPT | Performed by: NURSE PRACTITIONER

## 2024-08-07 RX ORDER — KETOROLAC TROMETHAMINE 10 MG/1
10 TABLET, FILM COATED ORAL EVERY 6 HOURS PRN
Qty: 20 TABLET | Refills: 0 | Status: SHIPPED | OUTPATIENT
Start: 2024-08-07 | End: 2024-10-01

## 2024-08-07 RX ORDER — METHOCARBAMOL 750 MG/1
750 TABLET, FILM COATED ORAL 4 TIMES DAILY PRN
Qty: 40 TABLET | Refills: 0 | Status: SHIPPED | OUTPATIENT
Start: 2024-08-07 | End: 2024-10-01

## 2024-08-07 ASSESSMENT — ENCOUNTER SYMPTOMS
CARDIOVASCULAR NEGATIVE: 1
ACTIVITY CHANGE: 1
GASTROINTESTINAL NEGATIVE: 1
PSYCHIATRIC NEGATIVE: 1
BACK PAIN: 1
NEUROLOGICAL NEGATIVE: 1
RESPIRATORY NEGATIVE: 1
HEMATOLOGIC/LYMPHATIC NEGATIVE: 1

## 2024-08-07 ASSESSMENT — PAIN SCALES - GENERAL: PAINLEVEL: WORST PAIN (10)

## 2024-08-07 NOTE — NURSING NOTE
"Pt presents to  for back pain x2 days. Pt states she has history with back problems and started to feel something wrong with her back last week. Pt having spasms that are unbearable.  She did use ice last night to relieve pain. Pt was given two ice packs in  exam room.    Chief Complaint   Patient presents with    Back Pain     Low       FOOD SECURITY SCREENING QUESTIONS  Hunger Vital Signs:  Within the past 12 months we worried whether our food would run out before we got money to buy more. Never  Within the past 12 months the food we bought just didn't last and we didn't have money to get more. Never  Anneliese Dearmon 8/7/2024 11:05 AM      Initial BP (!) 116/90 (BP Location: Right arm, Patient Position: Sitting, Cuff Size: Adult Regular)   Pulse 68   Temp 97  F (36.1  C) (Temporal)   Resp 12   Ht 1.676 m (5' 6\")   Wt 95.3 kg (210 lb)   LMP 01/01/2018 (Exact Date)   SpO2 98%   BMI 33.89 kg/m   Estimated body mass index is 33.89 kg/m  as calculated from the following:    Height as of this encounter: 1.676 m (5' 6\").    Weight as of this encounter: 95.3 kg (210 lb).  Medication Reconciliation: complete    Anneliese Ashley    "

## 2024-08-07 NOTE — PROGRESS NOTES
Chantel Menard  1970    ASSESSMENT/PLAN      Presents to rapid clinic with low back pain, no numbness or tingling.  No sciatic or pain moving into the legs.  No cauda equina symptoms.  Patient has history of low back pain but normally resolves with rest and heat.  X-ray obtained during visit which shows degenerative disc disease, no fracture. Patient's vitals are stable and she appears nontoxic.  Patient has ibuprofen listed as an allergy, states this has given her headache in the past.  Discussed risks and benefits of Toradol, patient states she has been tolerating her ibuprofen at home and this is more of an intolerance.  Patient would like to trial Toradol and prescription given.  Prescription for Robaxin as muscle relaxer given as well.      1. Acute bilateral low back pain without sciatica    - XR Lumbar Spine 2/3 Views  - methocarbamol (ROBAXIN) 750 MG tablet; Take 1 tablet (750 mg) by mouth 4 times daily as needed for muscle spasms  Dispense: 40 tablet; Refill: 0  - ketorolac (TORADOL) 10 MG tablet; Take 1 tablet (10 mg) by mouth every 6 hours as needed for moderate pain  Dispense: 20 tablet; Refill: 0   - Hold ibuprofen while using Toradol  - Continue rest, ice or heat  - Continue Biofreeze, Bengay, Voltaren or Lidoderm patches  - Follow up as needed for new or worsening symptoms          *Explanation of diagnosis, treatment options and risk and benefits of medications reviewed with patient. Patient agrees with plan of care.  *All questions were answered.    *Red flags symptoms were discussed and patient was advised when they should return for reevaluation or for prompt emergency evaluation.   *Patient was given verbal and written instructions on plan of care. Instructions were printed or are available on [employer] on electronic AVS.   *We discussed potential side effects of any prescribed or recommended therapies, as well as expectations for response to treatments.  *Patient discharged in stable  "condition    Sanjuana Cadena, CNP  [facility]    SUBJECTIVE  CHIEF COMPLAINT/ REASON FOR VISIT  Patient presents with:  Back Pain: Low     HISTORY OF PRESENT ILLNESS  Chantel Menard is a pleasant 53 year old female presents to rapid clinic today with back pain, low back pain with no sciatica.  Patient has had history of low back pain and degenerative disc disease.  Patient has not required medications in the past and usually only needed rest, heat packs and stretching.  Patient has had no numbness or tingling.  No sciatic pain.  No loss of bowel and bladder.     I have reviewed the nursing notes.  I have reviewed allergies, medication list, problem list, and past medical history.    REVIEW OF SYSTEMS  Review of Systems   Constitutional:  Positive for activity change.   HENT: Negative.     Respiratory: Negative.     Cardiovascular: Negative.    Gastrointestinal: Negative.    Genitourinary: Negative.    Musculoskeletal:  Positive for back pain.   Skin: Negative.    Neurological: Negative.    Hematological: Negative.    Psychiatric/Behavioral: Negative.     All other systems reviewed and are negative.       VITAL SIGNS  Vitals:    08/07/24 1102   BP: (!) 116/90   BP Location: Right arm   Patient Position: Sitting   Cuff Size: Adult Regular   Pulse: 68   Resp: 12   Temp: 97  F (36.1  C)   TempSrc: Temporal   SpO2: 98%   Weight: 95.3 kg (210 lb)   Height: 1.676 m (5' 6\")      Body mass index is 33.89 kg/m .      OBJECTIVE  PHYSICAL EXAM  Physical Exam  Vitals and nursing note reviewed.   Constitutional:       Appearance: Normal appearance.   HENT:      Head: Normocephalic.      Nose: Nose normal.      Mouth/Throat:      Mouth: Mucous membranes are moist.      Pharynx: Oropharynx is clear.   Eyes:      Pupils: Pupils are equal, round, and reactive to light.   Cardiovascular:      Rate and Rhythm: Normal rate.   Pulmonary:      Effort: Pulmonary effort is normal.   Abdominal:      Palpations: Abdomen " is soft.   Musculoskeletal:         General: Tenderness present. Normal range of motion.      Comments: Low back pain with movement, palpation.   Skin:     General: Skin is warm and dry.      Capillary Refill: Capillary refill takes less than 2 seconds.   Neurological:      General: No focal deficit present.      Mental Status: She is alert.            DIAGNOSTICS  Results for orders placed or performed in visit on 08/07/24   XR Lumbar Spine 2/3 Views     Status: None    Narrative    XR LUMBAR SPINE 2/3 VIEWS    HISTORY: Acute bilateral low back pain without sciatica .    COMPARISON: None.    TECHNIQUE: 3 views of the lumbosacral spine.    FINDINGS:    No acute fracture. Slight straightening of the lumbar lordosis. Focal  disc height loss and facet hypertrophy at L5-S1. Bilateral SI joint  degeneration.      DUSTY WHITT MD         SYSTEM ID:  [id number]

## 2024-08-09 ENCOUNTER — TELEPHONE (OUTPATIENT)
Dept: FAMILY MEDICINE | Facility: OTHER | Age: 54
End: 2024-08-09
Payer: COMMERCIAL

## 2024-08-09 DIAGNOSIS — M54.50 ACUTE BILATERAL LOW BACK PAIN WITHOUT SCIATICA: Primary | ICD-10-CM

## 2024-08-09 NOTE — TELEPHONE ENCOUNTER
Pt called and states she was told to start PT for back pain - she needs referral. Pt does have appointment on Monday with chiropractor. Pt states her back is not improving.    Anneliese Ashley on 8/9/2024 at 11:09 AM

## 2024-08-09 NOTE — LETTER
August 9, 2024      Chantel Menard  [id number] [facility] [address]        To Whom It May Concern:    Chantel Menard was seen in our clinic.  She was evaluated for back pain.  She notes persistent symptoms.  At this time, I would recommend that she have accommodations for work including extra standing breaks, icing the of the back, and as limited time sitting as possible to perform her job duties.  On her days where she has extreme pain, recommended she would be able to work from home if possible.  She does have follow-up scheduled on 8/22/2024 where this will be reevaluated.      Sincerely,        Cristina Boss PA-C

## 2024-08-09 NOTE — TELEPHONE ENCOUNTER
Spoke with patient briefly this afternoon regarding her back pain.  She states it is still persistent.  She continues to have pain radiating from the back down into the left buttock area.  This does not radiate down the leg.  She notes that it is worse in the morning but when she uses ice, her Toradol/Robaxin, and lidocaine patch she can get it to a tolerable level.    I did review her x-ray films with her noting that there was L5-S1 narrowing but no obvious signs of compression fracture.    She is looking for direction for further evaluation and management for these persisting symptoms.  Discussed that typical course would be appropriate to follow-up with Dr. Sotomayor in the family medicine clinic for persistent back pain.  He can evaluate, set up a treatment plan for her for persistent pain.    Discussed that in the meantime, recommend naproxen twice a day.  Tylenol adjunctively for pain.  Ice packs as needed.    I did provide her today work note to allow for accommodations as needed.    Discussed that if she does note worsening symptoms and does not feel like she can make it to that appointment, reevaluation either in the rapid clinic or the emergency room at that time.

## 2024-10-01 ENCOUNTER — OFFICE VISIT (OUTPATIENT)
Dept: FAMILY MEDICINE | Facility: OTHER | Age: 54
End: 2024-10-01
Attending: PHYSICIAN ASSISTANT
Payer: COMMERCIAL

## 2024-10-01 VITALS
BODY MASS INDEX: 34.02 KG/M2 | HEART RATE: 66 BPM | DIASTOLIC BLOOD PRESSURE: 70 MMHG | WEIGHT: 210.8 LBS | SYSTOLIC BLOOD PRESSURE: 134 MMHG | OXYGEN SATURATION: 98 % | TEMPERATURE: 97.5 F

## 2024-10-01 DIAGNOSIS — L82.1 SEBORRHEIC KERATOSES: ICD-10-CM

## 2024-10-01 DIAGNOSIS — N64.4 BREAST PAIN, RIGHT: ICD-10-CM

## 2024-10-01 DIAGNOSIS — M79.642 PAIN IN BOTH HANDS: ICD-10-CM

## 2024-10-01 DIAGNOSIS — F41.9 ANXIETY: ICD-10-CM

## 2024-10-01 DIAGNOSIS — M79.641 PAIN IN BOTH HANDS: ICD-10-CM

## 2024-10-01 DIAGNOSIS — M54.50 ACUTE BILATERAL LOW BACK PAIN WITHOUT SCIATICA: ICD-10-CM

## 2024-10-01 DIAGNOSIS — Z23 NEEDS FLU SHOT: ICD-10-CM

## 2024-10-01 DIAGNOSIS — G47.00 INSOMNIA, UNSPECIFIED TYPE: ICD-10-CM

## 2024-10-01 DIAGNOSIS — Z12.31 ENCOUNTER FOR SCREENING MAMMOGRAM FOR BREAST CANCER: ICD-10-CM

## 2024-10-01 DIAGNOSIS — Z00.00 ROUTINE HISTORY AND PHYSICAL EXAMINATION OF ADULT: Primary | ICD-10-CM

## 2024-10-01 DIAGNOSIS — R91.1 PULMONARY NODULE: ICD-10-CM

## 2024-10-01 LAB
ALBUMIN SERPL BCG-MCNC: 4.6 G/DL (ref 3.5–5.2)
ALP SERPL-CCNC: 118 U/L (ref 40–150)
ALT SERPL W P-5'-P-CCNC: 26 U/L (ref 0–50)
ANION GAP SERPL CALCULATED.3IONS-SCNC: 9 MMOL/L (ref 7–15)
AST SERPL W P-5'-P-CCNC: 27 U/L (ref 0–45)
BASOPHILS # BLD AUTO: 0.1 10E3/UL (ref 0–0.2)
BASOPHILS NFR BLD AUTO: 1 %
BILIRUB SERPL-MCNC: 0.5 MG/DL
BUN SERPL-MCNC: 13.8 MG/DL (ref 6–20)
CALCIUM SERPL-MCNC: 9.5 MG/DL (ref 8.8–10.4)
CHLORIDE SERPL-SCNC: 105 MMOL/L (ref 98–107)
CHOLEST SERPL-MCNC: 242 MG/DL
CREAT SERPL-MCNC: 0.69 MG/DL (ref 0.51–0.95)
CRP SERPL-MCNC: 3.36 MG/L
EGFRCR SERPLBLD CKD-EPI 2021: >90 ML/MIN/1.73M2
EOSINOPHIL # BLD AUTO: 0.1 10E3/UL (ref 0–0.7)
EOSINOPHIL NFR BLD AUTO: 2 %
ERYTHROCYTE [DISTWIDTH] IN BLOOD BY AUTOMATED COUNT: 12.4 % (ref 10–15)
ERYTHROCYTE [SEDIMENTATION RATE] IN BLOOD BY WESTERGREN METHOD: 9 MM/HR (ref 0–30)
FASTING STATUS PATIENT QL REPORTED: YES
FASTING STATUS PATIENT QL REPORTED: YES
GLUCOSE SERPL-MCNC: 93 MG/DL (ref 70–99)
HCO3 SERPL-SCNC: 28 MMOL/L (ref 22–29)
HCT VFR BLD AUTO: 45.4 % (ref 35–47)
HDLC SERPL-MCNC: 77 MG/DL
HGB BLD-MCNC: 14.6 G/DL (ref 11.7–15.7)
IMM GRANULOCYTES # BLD: 0 10E3/UL
IMM GRANULOCYTES NFR BLD: 0 %
LDLC SERPL CALC-MCNC: 148 MG/DL
LYMPHOCYTES # BLD AUTO: 1.5 10E3/UL (ref 0.8–5.3)
LYMPHOCYTES NFR BLD AUTO: 36 %
MCH RBC QN AUTO: 31 PG (ref 26.5–33)
MCHC RBC AUTO-ENTMCNC: 32.2 G/DL (ref 31.5–36.5)
MCV RBC AUTO: 96 FL (ref 78–100)
MONOCYTES # BLD AUTO: 0.4 10E3/UL (ref 0–1.3)
MONOCYTES NFR BLD AUTO: 9 %
NEUTROPHILS # BLD AUTO: 2.2 10E3/UL (ref 1.6–8.3)
NEUTROPHILS NFR BLD AUTO: 52 %
NONHDLC SERPL-MCNC: 165 MG/DL
NRBC # BLD AUTO: 0 10E3/UL
NRBC BLD AUTO-RTO: 0 /100
PLAT MORPH BLD: ABNORMAL
PLATELET # BLD AUTO: NORMAL 10*3/UL
POTASSIUM SERPL-SCNC: 4.3 MMOL/L (ref 3.4–5.3)
PROT SERPL-MCNC: 8.2 G/DL (ref 6.4–8.3)
RBC # BLD AUTO: 4.71 10E6/UL (ref 3.8–5.2)
RBC MORPH BLD: ABNORMAL
RHEUMATOID FACT SERPL-ACNC: <10 IU/ML
SODIUM SERPL-SCNC: 142 MMOL/L (ref 135–145)
TRIGL SERPL-MCNC: 86 MG/DL
WBC # BLD AUTO: 4.2 10E3/UL (ref 4–11)

## 2024-10-01 PROCEDURE — 85652 RBC SED RATE AUTOMATED: CPT | Mod: ZL | Performed by: PHYSICIAN ASSISTANT

## 2024-10-01 PROCEDURE — 82310 ASSAY OF CALCIUM: CPT | Mod: ZL | Performed by: PHYSICIAN ASSISTANT

## 2024-10-01 PROCEDURE — 90471 IMMUNIZATION ADMIN: CPT | Performed by: PHYSICIAN ASSISTANT

## 2024-10-01 PROCEDURE — 86431 RHEUMATOID FACTOR QUANT: CPT | Mod: ZL | Performed by: PHYSICIAN ASSISTANT

## 2024-10-01 PROCEDURE — 36415 COLL VENOUS BLD VENIPUNCTURE: CPT | Mod: ZL | Performed by: PHYSICIAN ASSISTANT

## 2024-10-01 PROCEDURE — 86038 ANTINUCLEAR ANTIBODIES: CPT | Mod: ZL | Performed by: PHYSICIAN ASSISTANT

## 2024-10-01 PROCEDURE — 80061 LIPID PANEL: CPT | Mod: ZL | Performed by: PHYSICIAN ASSISTANT

## 2024-10-01 PROCEDURE — 17110 DESTRUCTION B9 LES UP TO 14: CPT | Performed by: PHYSICIAN ASSISTANT

## 2024-10-01 PROCEDURE — 85004 AUTOMATED DIFF WBC COUNT: CPT | Mod: ZL | Performed by: PHYSICIAN ASSISTANT

## 2024-10-01 PROCEDURE — 85014 HEMATOCRIT: CPT | Mod: ZL | Performed by: PHYSICIAN ASSISTANT

## 2024-10-01 PROCEDURE — 99396 PREV VISIT EST AGE 40-64: CPT | Mod: 25 | Performed by: PHYSICIAN ASSISTANT

## 2024-10-01 PROCEDURE — 90673 RIV3 VACCINE NO PRESERV IM: CPT | Performed by: PHYSICIAN ASSISTANT

## 2024-10-01 PROCEDURE — 99214 OFFICE O/P EST MOD 30 MIN: CPT | Mod: 25 | Performed by: PHYSICIAN ASSISTANT

## 2024-10-01 PROCEDURE — 86200 CCP ANTIBODY: CPT | Mod: ZL | Performed by: PHYSICIAN ASSISTANT

## 2024-10-01 PROCEDURE — 86140 C-REACTIVE PROTEIN: CPT | Mod: ZL | Performed by: PHYSICIAN ASSISTANT

## 2024-10-01 RX ORDER — KETOROLAC TROMETHAMINE 10 MG/1
10 TABLET, FILM COATED ORAL EVERY 6 HOURS PRN
Qty: 20 TABLET | Refills: 0 | Status: SHIPPED | OUTPATIENT
Start: 2024-10-01

## 2024-10-01 RX ORDER — HYDROXYZINE PAMOATE 25 MG/1
25-50 CAPSULE ORAL
Qty: 90 CAPSULE | Refills: 0 | Status: SHIPPED | OUTPATIENT
Start: 2024-10-01

## 2024-10-01 RX ORDER — METHOCARBAMOL 750 MG/1
750 TABLET, FILM COATED ORAL 4 TIMES DAILY PRN
Qty: 40 TABLET | Refills: 0 | Status: SHIPPED | OUTPATIENT
Start: 2024-10-01

## 2024-10-01 SDOH — HEALTH STABILITY: PHYSICAL HEALTH: ON AVERAGE, HOW MANY DAYS PER WEEK DO YOU ENGAGE IN MODERATE TO STRENUOUS EXERCISE (LIKE A BRISK WALK)?: 0 DAYS

## 2024-10-01 SDOH — HEALTH STABILITY: PHYSICAL HEALTH: ON AVERAGE, HOW MANY MINUTES DO YOU ENGAGE IN EXERCISE AT THIS LEVEL?: 0 MIN

## 2024-10-01 ASSESSMENT — SOCIAL DETERMINANTS OF HEALTH (SDOH): HOW OFTEN DO YOU GET TOGETHER WITH FRIENDS OR RELATIVES?: THREE TIMES A WEEK

## 2024-10-01 NOTE — PROGRESS NOTES
Preventive Care Visit  [facility] [place]  Lesli Rivera PA-C, Family Medicine  Oct 1, 2024      Assessment & Plan     1. Routine history and physical examination of adult  Preventative screenings updated as below.  Labs pending as below.  - CBC and Differential; Future  - Comprehensive Metabolic Panel; Future  - Lipid Panel; Future  - CBC and Differential  - Comprehensive Metabolic Panel  - Lipid Panel    2. Needs flu shot  - INFLUENZA VACCINE TRIVALENT(FLUBLOK)    3. Encounter for screening mammogram for breast cancer  4. Breast pain, right  Due for annual breast screening.  6+ month history of right-sided breast pain.  Unremarkable breast exam in clinic.  Diagnostic mammogram ordered for additional evaluation.  - MA Diagnostic Digital Bilateral; Future    5. Acute bilateral low back pain without sciatica  Refilled medication for symptomatic management as needed.  Consider physical therapy once becomes more financially feasible.  - ketorolac (TORADOL) 10 MG tablet; Take 1 tablet (10 mg) by mouth every 6 hours as needed for moderate pain.  Dispense: 20 tablet; Refill: 0  - methocarbamol (ROBAXIN) 750 MG tablet; Take 1 tablet (750 mg) by mouth 4 times daily as needed for muscle spasms.  Dispense: 40 tablet; Refill: 0    6. Pain in both hands  Chronic, previously diagnosed with presumed osteoarthritis refractory to injections with orthopedics most recently in 2023.  Additional lab work pending as below for rule out of potential underlying rheumatologic cause.  - CBC and Differential; Future  - Comprehensive Metabolic Panel; Future  - CRP inflammation; Future  - Sedimentation Rate (ESR); Future  - Cyclic Citrullinated Peptide Antibody IgG; Future  - Rheumatoid factor; Future  - Anti Nuclear Diana IgG by IFA with Reflex; Future  - CBC and Differential  - Comprehensive Metabolic Panel  - CRP inflammation  - Sedimentation Rate (ESR)  - Cyclic Citrullinated Peptide Antibody IgG  - Rheumatoid factor  -  "Anti Nuclear Diana IgG by IFA with Reflex    7. Anxiety  8. Insomnia, unspecified type  Chronic difficulties with anxiety and insomnia secondary to significant amount of stress at work.  Hydroxyzine given to be used nightly prn to help with calming anxiety and falling asleep. Consider daily medication if symptoms persist. Working to obtain a new job.   - hydrOXYzine han (VISTARIL) 25 MG capsule; Take 1-2 capsules (25-50 mg) by mouth nightly as needed for itching.  Dispense: 90 capsule; Refill: 0    9. Seborrheic keratoses  For the lesions, cryotherapy was used in 3-5 second cycles x3. Patient tolerated procedure well. Discussed side effects of redness, blistering and pain and typically resolve in a few days without any intervention. Recommend good handwashing and no picking. F/u for repeat cryotherapy treatment in 2-3 weeks or may use OTC wart treatments if desired.     10. Pulmonary nodule  Solitary lesion 2 mm noted incidentally in 4/21 and has not pursued follow up. Ordered follow up CT imaging as below.   - CT Chest w/o Contrast; Future    ADDENDUM:  Total of 10 lesions treated with cryotherapy on back, left arm, and left leg.   Lesli Rivera PA-C on 10/14/2024 at 10:32 AM    BMI  Estimated body mass index is 34.02 kg/m  as calculated from the following:    Height as of 8/7/24: 1.676 m (5' 6\").    Weight as of this encounter: 95.6 kg (210 lb 12.8 oz).   Weight management plan: Discussed healthy diet and exercise guidelines    Counseling  Appropriate preventive services were addressed with this patient via screening, questionnaire, or discussion as appropriate for fall prevention, nutrition, physical activity, Tobacco-use cessation, social engagement, weight loss and cognition.  Checklist reviewing preventive services available has been given to the patient.  Reviewed patient's diet, addressing concerns and/or questions.           No follow-ups on file.    Subjective   Rosmery is a 54 year old, presenting for " the following:  Physical        10/1/2024    10:23 AM   Additional Questions   Roomed by Belkys RIVAS CMA        Health Care Directive  Patient does not have a Health Care Directive or Living Will: Discussed advance care planning with patient; however, patient declined at this time.    HPI  Here for annual physical.  Multiple concerns as below.    Breast:  Patient reports a 6-month history of right-sided breast pain that occurs a few times per week.  No known injury to the area.  No overlying skin changes, dimpling, nipple retraction or discharge, palpable masses.  Had mammogram completed last fall with follow-up diagnostic testing that was benign.  Due for follow-up mammogram.  Mother with a history of colon cancer.    Moles:  Has multiple seborrheic keratoses on back that she would like frozen today.    Stress:  Has been under significant amount of stress from work.  Is affecting her mood at home.  Dealing with decreased mood, struggling with falling asleep as she is unable to shut her brain off.  She is able to stay asleep.  She wonders if she could try a prn medication to help with sleeping.  She is working to find a new job.    Back:  Recently seen in the rapid clinic for evaluation of acute low back pain.  Treated with muscle relaxer and NSAID.  Has a fairly sedentary job and has not been as regularly exercising.  She has known degenerative disc disease.  PT referral was placed however she did not pursue it as of yet due to financial concerns.  She is requesting refill of muscle relaxer and NSAIDs to be used as needed.    Hand/finger:  Chronic pain in her bilateral hands and fingers.  Previously diagnosed with presumed osteoarthritis and has managed with injections most recently 2023.  She has noticed progression of her symptoms with visible nodules and some angulation of her fingers.  She wonders about possible underlying autoimmune disease.    Patient had solitary pulmonary nodule noted as incidental finding on  4/21.  12-month follow-up had been recommended but was not completed due to insurance concerns.  Patient is requesting to have this follow-up completed.      Risk for STI?: No  Last pap: 07/2021 NIL, HPV-  Any hx of abnormal paps:  Hx PITER II, Had negative pap and HPV 5/2020 as well per chart review  FH of early CA?: Mother- colon cancer  Cholesterol/DM concerns/screening: Due  Tobacco?: No  Calcium intake: Dietary  DEXA: NA  Last mammo: 10/2023  Colonoscopy: 11/2023  Immunizations: Zoster, flu, COVID        10/1/2024   General Health   How would you rate your overall physical health? Good   Feel stress (tense, anxious, or unable to sleep) To some extent      (!) STRESS CONCERN      10/1/2024   Nutrition   Three or more servings of calcium each day? Yes   Diet: Regular (no restrictions)   How many servings of fruit and vegetables per day? (!) 2-3   How many sweetened beverages each day? 0-1            10/1/2024   Exercise   Days per week of moderate/strenous exercise 0 days   Average minutes spent exercising at this level 0 min      (!) EXERCISE CONCERN      10/1/2024   Social Factors   Frequency of gathering with friends or relatives Three times a week   Worry food won't last until get money to buy more No   Food not last or not have enough money for food? No   Do you have housing? (Housing is defined as stable permanent housing and does not include staying ouside in a car, in a tent, in an abandoned building, in an overnight shelter, or couch-surfing.) Yes   Are you worried about losing your housing? Yes   Lack of transportation? No   Unable to get utilities (heat,electricity)? No   Want help with housing or utility concern? No      (!) HOUSING CONCERN PRESENT      10/1/2024   Fall Risk   Fallen 2 or more times in the past year? No   Trouble with walking or balance? No             10/1/2024   Dental   Dentist two times every year? Yes                 Today's PHQ-2 Score:       8/7/2024    11:02 AM   PHQ-2 ( 1999  [employer])   Q1: Little interest or pleasure in doing things 0   Q2: Feeling down, depressed or hopeless 0   PHQ-2 Score 0         10/1/2024   Substance Use   Alcohol more than 3/day or more than 7/wk No   Do you use any other substances recreationally? No        Social History     Tobacco Use    Smoking status: Never    Smokeless tobacco: Never   Vaping Use    Vaping status: Never Used   Substance Use Topics    Alcohol use: Yes    Drug use: Not Currently           10/18/2023   LAST FHS-7 RESULTS   1st degree relative breast or ovarian cancer No   Any relative bilateral breast cancer No   Any male have breast cancer No   Any ONE woman have BOTH breast AND ovarian cancer No   Any woman with breast cancer before 50yrs No   2 or more relatives with breast AND/OR ovarian cancer No   2 or more relatives with breast AND/OR bowel cancer Yes           Mammogram Screening - Mammogram every 1-2 years updated in Health Maintenance based on mutual decision making          10/1/2024   One time HIV Screening   Previous HIV test? Yes          10/1/2024   STI Screening   New sexual partner(s) since last STI/HIV test? No        History of abnormal Pap smear: YES - reflected in Problem List and Health Maintenance accordingly        7/27/2021     1:00 PM   PAP / HPV   PAP-ABSTRACT See Scanned Document           This result is from an external source.     ASCVD Risk   The 10-year ASCVD risk score (Donya DK, et al., 2019) is: 2.1%    Values used to calculate the score:      Age: 54 years      Sex: Female      Is Non- : No      Diabetic: No      Tobacco smoker: No      Systolic Blood Pressure: 134 mmHg      Is BP treated: No      HDL Cholesterol: 77 mg/dL      Total Cholesterol: 242 mg/dL           Reviewed and updated as needed this visit by Provider                    No past medical history on file.  Past Surgical History:   Procedure Laterality Date    COLONOSCOPY N/A 11/08/2023    normal, f/u 10 years  "    OB History   No obstetric history on file.            Objective    Exam  /70   Pulse 66   Temp 97.5  F (36.4  C) (Tympanic)   Wt 95.6 kg (210 lb 12.8 oz)   LMP 01/01/2018 (Exact Date)   SpO2 98%   BMI 34.02 kg/m     Estimated body mass index is 34.02 kg/m  as calculated from the following:    Height as of 8/7/24: 1.676 m (5' 6\").    Weight as of this encounter: 95.6 kg (210 lb 12.8 oz).    Physical Exam  GENERAL: alert and no distress  EYES: Eyes grossly normal to inspection, PERRL and conjunctivae and sclerae normal  HENT: ear canals and TM's normal, nose and mouth without ulcers or lesions  RESP: lungs clear to auscultation - no rales, rhonchi or wheezes  CV: regular rate and rhythm, normal S1 S2, no S3 or S4, no murmur, click or rub, no peripheral edema  MS: no gross musculoskeletal defects noted, no edema  SKIN: no suspicious lesions or rashes and scattered seborrheic keratoses throughout back, bilateral under breasts and arms, right leg.  Multiple lesions treated with cryotherapy.  PSYCH: mentation appears normal, affect normal/bright        Signed Electronically by: Lesli Rivera PA-C    "

## 2024-10-04 LAB
ANA PAT SER IF-IMP: ABNORMAL
ANA SER QL IF: POSITIVE
ANA TITR SER IF: ABNORMAL {TITER}

## 2024-10-06 LAB — CCP AB SER IA-ACNC: 1 U/ML

## 2024-10-30 ENCOUNTER — MYC MEDICAL ADVICE (OUTPATIENT)
Dept: FAMILY MEDICINE | Facility: OTHER | Age: 54
End: 2024-10-30
Payer: COMMERCIAL

## 2024-11-04 ENCOUNTER — HOSPITAL ENCOUNTER (OUTPATIENT)
Dept: MAMMOGRAPHY | Facility: OTHER | Age: 54
Discharge: HOME | End: 2024-11-04
Attending: PHYSICIAN ASSISTANT
Payer: COMMERCIAL

## 2024-11-04 ENCOUNTER — HOSPITAL ENCOUNTER (OUTPATIENT)
Dept: CT IMAGING | Facility: OTHER | Age: 54
Discharge: HOME | End: 2024-11-04
Attending: PHYSICIAN ASSISTANT
Payer: COMMERCIAL

## 2024-11-04 DIAGNOSIS — Z12.31 ENCOUNTER FOR SCREENING MAMMOGRAM FOR BREAST CANCER: ICD-10-CM

## 2024-11-04 DIAGNOSIS — N64.4 BREAST PAIN, RIGHT: ICD-10-CM

## 2024-11-04 DIAGNOSIS — R91.1 PULMONARY NODULE: ICD-10-CM

## 2024-11-04 PROCEDURE — 77063 BREAST TOMOSYNTHESIS BI: CPT

## 2024-11-04 PROCEDURE — 71250 CT THORAX DX C-: CPT

## 2025-01-16 NOTE — PROGRESS NOTES
Assessment & Plan     Seborrheic keratoses  For the 10 skin lesions throughout back, chest, and lower extremities, repeat cryotherapy was used in 3-5 second cycles x3. Patient tolerated procedure well. Discussed side effects of redness, blistering and pain and typically resolve in a few days without any intervention. Recommend good handwashing and no picking. F/u for repeat cryotherapy treatment in 2-3 weeks.            No follow-ups on file.    Kiel Botello is a 54 year old, presenting for the following health issues:  Follow Up    HPI   Here for follow-up repeat cryotherapy of seborrheic keratoses.  10 lesions were treated with cryotherapy throughout patient's back, chest, and lower extremities at her office visit 10/2024. Tolerated procedure well. Requesting repeat treatment.       PAST MEDICAL HISTORY: No past medical history on file.    PAST SURGICAL HISTORY:   Past Surgical History:   Procedure Laterality Date    COLONOSCOPY N/A 11/08/2023    normal, f/u 10 years       FAMILY HISTORY:   Family History   Problem Relation Age of Onset    Colon Cancer Mother     Heart Disease Mother     Multiple myeloma Father     Parkinsonism Father     Heart Failure Father     Seizure Disorder Brother     Obesity Brother        SOCIAL HISTORY:   Social History     Tobacco Use    Smoking status: Never    Smokeless tobacco: Never   Substance Use Topics    Alcohol use: Yes        Allergies   Allergen Reactions    Ibuprofen Headache     Current Outpatient Medications   Medication Sig Dispense Refill    ketorolac (TORADOL) 10 MG tablet Take 1 tablet (10 mg) by mouth every 6 hours as needed for moderate pain. 20 tablet 0    methocarbamol (ROBAXIN) 750 MG tablet Take 1 tablet (750 mg) by mouth 4 times daily as needed for muscle spasms. 40 tablet 0    hydrOXYzine han (VISTARIL) 25 MG capsule Take 1-2 capsules (25-50 mg) by mouth nightly as needed for itching. (Patient not taking: Reported on 1/20/2025) 90 capsule 0     No  "current facility-administered medications for this visit.           Objective    /72   Pulse 67   Temp 98  F (36.7  C) (Tympanic)   Resp 18   Ht 1.676 m (5' 6\")   Wt 89.8 kg (198 lb)   LMP 01/01/2018 (Exact Date)   SpO2 98%   BMI 31.96 kg/m    Body mass index is 31.96 kg/m .  Physical Exam   General: Pleasant, in no apparent distress.  Skin: Scattered light to medium brown raised sandpaper like skin lesions throughout back, chest, and lower extremities.   Psych: Appropriate mood and affect.      Signed Electronically by: Lesli Rivera PA-C    "

## 2025-01-20 ENCOUNTER — OFFICE VISIT (OUTPATIENT)
Dept: FAMILY MEDICINE | Facility: OTHER | Age: 55
End: 2025-01-20
Attending: PHYSICIAN ASSISTANT
Payer: COMMERCIAL

## 2025-01-20 VITALS
OXYGEN SATURATION: 98 % | HEART RATE: 67 BPM | TEMPERATURE: 98 F | SYSTOLIC BLOOD PRESSURE: 138 MMHG | RESPIRATION RATE: 18 BRPM | DIASTOLIC BLOOD PRESSURE: 72 MMHG | BODY MASS INDEX: 31.82 KG/M2 | WEIGHT: 198 LBS | HEIGHT: 66 IN

## 2025-01-20 DIAGNOSIS — L82.1 SEBORRHEIC KERATOSES: Primary | ICD-10-CM

## 2025-01-20 ASSESSMENT — PAIN SCALES - GENERAL: PAINLEVEL_OUTOF10: NO PAIN (0)

## 2025-01-20 NOTE — NURSING NOTE
"Chief Complaint   Patient presents with    Follow Up       Initial /72   Pulse 67   Temp 98  F (36.7  C) (Tympanic)   Resp 18   Ht 1.676 m (5' 6\")   Wt 89.8 kg (198 lb)   LMP 01/01/2018 (Exact Date)   SpO2 98%   BMI 31.96 kg/m   Estimated body mass index is 31.96 kg/m  as calculated from the following:    Height as of this encounter: 1.676 m (5' 6\").    Weight as of this encounter: 89.8 kg (198 lb).  Medication Review: complete    The next two questions are to help us understand your food security.  If you are feeling you need any assistance in this area, we have resources available to support you today.          10/1/2024   SDOH- Food Insecurity   Within the past 12 months, did you worry that your food would run out before you got money to buy more? N    Within the past 12 months, did the food you bought just not last and you didn t have money to get more? N        Proxy-reported         Health Care Directive:  Patient does not have a Health Care Directive: Discussed advance care planning with patient; however, patient declined at this time.    Morena Mar LPN      "

## 2025-06-03 ENCOUNTER — MYC MEDICAL ADVICE (OUTPATIENT)
Dept: FAMILY MEDICINE | Facility: OTHER | Age: 55
End: 2025-06-03
Payer: COMMERCIAL

## 2025-06-03 DIAGNOSIS — M54.50 ACUTE BILATERAL LOW BACK PAIN WITHOUT SCIATICA: ICD-10-CM

## 2025-06-03 RX ORDER — KETOROLAC TROMETHAMINE 10 MG/1
10 TABLET, FILM COATED ORAL EVERY 6 HOURS PRN
Qty: 20 TABLET | Refills: 0 | Status: SHIPPED | OUTPATIENT
Start: 2025-06-03

## 2025-06-03 RX ORDER — METHOCARBAMOL 750 MG/1
750 TABLET, FILM COATED ORAL 4 TIMES DAILY PRN
Qty: 40 TABLET | Refills: 0 | Status: SHIPPED | OUTPATIENT
Start: 2025-06-03

## 2025-06-03 NOTE — TELEPHONE ENCOUNTER
Requesting rx for Ketorolac and muscle relaxer as discussed in previous OV.     LOV  1/20/25    Ketorolac last ordered 10/1/24  Robaxin last ordered 10/1/24    Keyon'd up orders.     Routing to provider to review and respond.  Karen Washburn RN on 6/3/2025 at 10:21 AM

## 2025-09-01 ENCOUNTER — PATIENT OUTREACH (OUTPATIENT)
Dept: CARE COORDINATION | Facility: CLINIC | Age: 55
End: 2025-09-01
Payer: COMMERCIAL

## (undated) DEVICE — ENDO BRUSH CHANNEL MASTER CLEANING 2-4.2MM BW-412T

## (undated) DEVICE — SUCTION MANIFOLD NEPTUNE 2 SYS 4 PORT 0702-020-000

## (undated) DEVICE — TUBING SUCTION 10'X3/16" N510

## (undated) DEVICE — SOL WATER 1500ML

## (undated) DEVICE — ENDO KIT COMPLIANCE DYKENDOCMPLY

## (undated) RX ORDER — METHYLPREDNISOLONE ACETATE 40 MG/ML
INJECTION, SUSPENSION INTRA-ARTICULAR; INTRALESIONAL; INTRAMUSCULAR; SOFT TISSUE
Status: DISPENSED
Start: 2023-04-07

## (undated) RX ORDER — ONDANSETRON 2 MG/ML
INJECTION INTRAMUSCULAR; INTRAVENOUS
Status: DISPENSED
Start: 2023-11-08

## (undated) RX ORDER — BUPIVACAINE HYDROCHLORIDE 5 MG/ML
INJECTION, SOLUTION EPIDURAL; INTRACAUDAL
Status: DISPENSED
Start: 2023-04-07

## (undated) RX ORDER — PROPOFOL 10 MG/ML
INJECTION, EMULSION INTRAVENOUS
Status: DISPENSED
Start: 2023-11-08